# Patient Record
Sex: MALE | Race: BLACK OR AFRICAN AMERICAN | Employment: OTHER | URBAN - METROPOLITAN AREA
[De-identification: names, ages, dates, MRNs, and addresses within clinical notes are randomized per-mention and may not be internally consistent; named-entity substitution may affect disease eponyms.]

---

## 2021-09-27 ENCOUNTER — HOSPITAL ENCOUNTER (OUTPATIENT)
Dept: PREADMISSION TESTING | Age: 76
Discharge: HOME | End: 2021-09-27
Payer: MEDICARE

## 2021-09-27 PROCEDURE — U0005 INFEC AGEN DETEC AMPLI PROBE: HCPCS

## 2021-09-28 LAB — SARS-COV-2, NAA: NOT DETECTED

## 2021-09-30 ENCOUNTER — HOSPITAL ENCOUNTER (OUTPATIENT)
Age: 76
Discharge: HOME | End: 2021-09-30
Attending: UROLOGY | Admitting: UROLOGY
Payer: MEDICARE

## 2021-09-30 VITALS
TEMPERATURE: 98.7 F | HEART RATE: 83 BPM | OXYGEN SATURATION: 97 % | WEIGHT: 251.6 LBS | SYSTOLIC BLOOD PRESSURE: 169 MMHG | BODY MASS INDEX: 38.13 KG/M2 | DIASTOLIC BLOOD PRESSURE: 94 MMHG | HEIGHT: 68 IN

## 2021-09-30 PROBLEM — N40.0 ENLARGED PROSTATE: Status: ACTIVE | Noted: 2021-09-30

## 2021-09-30 PROCEDURE — 74011250636 HC RX REV CODE- 250/636: Performed by: NURSE ANESTHETIST, CERTIFIED REGISTERED

## 2021-09-30 PROCEDURE — 74011250637 HC RX REV CODE- 250/637: Performed by: NURSE ANESTHETIST, CERTIFIED REGISTERED

## 2021-09-30 RX ORDER — SODIUM CHLORIDE, SODIUM LACTATE, POTASSIUM CHLORIDE, CALCIUM CHLORIDE 600; 310; 30; 20 MG/100ML; MG/100ML; MG/100ML; MG/100ML
75 INJECTION, SOLUTION INTRAVENOUS CONTINUOUS
Status: DISCONTINUED | OUTPATIENT
Start: 2021-09-30 | End: 2021-09-30 | Stop reason: HOSPADM

## 2021-09-30 RX ORDER — FAMOTIDINE 20 MG/1
20 TABLET, FILM COATED ORAL ONCE
Status: COMPLETED | OUTPATIENT
Start: 2021-09-30 | End: 2021-09-30

## 2021-09-30 RX ORDER — SODIUM CHLORIDE 0.9 % (FLUSH) 0.9 %
5-40 SYRINGE (ML) INJECTION EVERY 8 HOURS
Status: DISCONTINUED | OUTPATIENT
Start: 2021-09-30 | End: 2021-09-30 | Stop reason: HOSPADM

## 2021-09-30 RX ORDER — SODIUM CHLORIDE 0.9 % (FLUSH) 0.9 %
5-40 SYRINGE (ML) INJECTION AS NEEDED
Status: DISCONTINUED | OUTPATIENT
Start: 2021-09-30 | End: 2021-09-30 | Stop reason: HOSPADM

## 2021-09-30 RX ADMIN — SODIUM CHLORIDE, SODIUM LACTATE, POTASSIUM CHLORIDE, AND CALCIUM CHLORIDE 75 ML/HR: 600; 310; 30; 20 INJECTION, SOLUTION INTRAVENOUS at 11:02

## 2021-09-30 RX ADMIN — FAMOTIDINE 20 MG: 20 TABLET ORAL at 11:02

## 2021-09-30 NOTE — DISCHARGE INSTRUCTIONS
DISCHARGE SUMMARY from Nurse    PATIENT INSTRUCTIONS:    After general anesthesia or intravenous sedation, for 24 hours or while taking prescription Narcotics:  What to do at Home:  Recommended activity: Activity as tolerated,     please follow up with Dr Ras Lafleur    *  Please give a list of your current medications to your Primary Care Provider. *  Please update this list whenever your medications are discontinued, doses are      changed, or new medications (including over-the-counter products) are added. *  Please carry medication information at all times in case of emergency situations. These are general instructions for a healthy lifestyle:    No smoking/ No tobacco products/ Avoid exposure to second hand smoke  Surgeon General's Warning:  Quitting smoking now greatly reduces serious risk to your health. Obesity, smoking, and sedentary lifestyle greatly increases your risk for illness    A healthy diet, regular physical exercise & weight monitoring are important for maintaining a healthy lifestyle    You may be retaining fluid if you have a history of heart failure or if you experience any of the following symptoms:  Weight gain of 3 pounds or more overnight or 5 pounds in a week, increased swelling in our hands or feet or shortness of breath while lying flat in bed. Please call your doctor as soon as you notice any of these symptoms; do not wait until your next office visit. Zzish Activation    Thank you for requesting access to [facility]zish. Please follow the instructions below to securely access and download your online medical record. Zzish allows you to send messages to your doctor, view your test results, renew your prescriptions, schedule appointments, and more. How Do I Sign Up? 1. In your internet browser, go to [email or web address]. 2. Click on the First Time User? Click Here link in the Sign In box. You will see the New Member Sign Up page.   3. Enter your Cor[employer]c Access Code exactly as it appears below. You will not need to use this code after youve completed the sign-up process. If you do not sign up before the expiration date, you must request a new code. Cor[employer]c Access Code: [email or web address]  Expires: 2021  9:39 AM (This is the date your Correlec access code will )    4. Enter the last four digits of your Social Security Number (xxxx) and Date of Birth (mm/dd/yyyy) as indicated and click Submit. You will be taken to the next sign-up page. 5. Create a Correlec ID. This will be your Cor[employer]c login ID and cannot be changed, so think of one that is secure and easy to remember. 6. Create a Correlec password. You can change your password at any time. 7. Enter your Password Reset Question and Answer. This can be used at a later time if you forget your password. 8. Enter your e-mail address. You will receive e-mail notification when new information is available in [address] 9. Click Sign Up. You can now view and download portions of your medical record. 10. Click the Download Summary menu link to download a portable copy of your medical information. Additional Information    If you have questions, please visit the Frequently Asked Questions section of the Cor[employer]c website at [email or web address] Remember, Cor[employer]c is NOT to be used for urgent needs. For medical emergencies, dial 911. Patient armband removed and shredded    The discharge information has been reviewed with the patient. The patient verbalized understanding. Discharge medications reviewed with the patient and appropriate educational materials and side effects teaching were provided.   ___________________________________________________________________________________________________________________________________

## 2022-01-09 ENCOUNTER — HOSPITAL ENCOUNTER (EMERGENCY)
Age: 77
Discharge: HOME | End: 2022-01-09
Attending: INTERNAL MEDICINE
Payer: MEDICARE

## 2022-01-09 ENCOUNTER — APPOINTMENT (OUTPATIENT)
Dept: GENERAL RADIOLOGY | Age: 77
End: 2022-01-09
Attending: INTERNAL MEDICINE
Payer: MEDICARE

## 2022-01-09 VITALS
RESPIRATION RATE: 18 BRPM | WEIGHT: 200 LBS | OXYGEN SATURATION: 100 % | HEART RATE: 88 BPM | HEIGHT: 68 IN | SYSTOLIC BLOOD PRESSURE: 145 MMHG | TEMPERATURE: 98.8 F | DIASTOLIC BLOOD PRESSURE: 76 MMHG | BODY MASS INDEX: 30.31 KG/M2

## 2022-01-09 DIAGNOSIS — J45.909 REACTIVE AIRWAY DISEASE WITHOUT COMPLICATION, UNSPECIFIED ASTHMA SEVERITY, UNSPECIFIED WHETHER PERSISTENT: Primary | ICD-10-CM

## 2022-01-09 DIAGNOSIS — Z20.822 PERSON UNDER INVESTIGATION FOR COVID-19: ICD-10-CM

## 2022-01-09 DIAGNOSIS — J18.9 ATYPICAL PNEUMONIA: ICD-10-CM

## 2022-01-09 LAB
ANION GAP SERPL CALC-SCNC: 9 MMOL/L
ARTERIAL PATENCY WRIST A: ABNORMAL
BASE EXCESS BLDA CALC-SCNC: 3.1 MMOL/L (ref 0–2.5)
BASOPHILS # BLD: 0 K/UL (ref 0–0.1)
BASOPHILS NFR BLD: 0 % (ref 0–2)
BDY SITE: ABNORMAL
BNP SERPL-MCNC: 34 PG/ML (ref 0–100)
BUN SERPL-MCNC: 13 MG/DL (ref 9–21)
BUN/CREAT SERPL: 10
CA-I BLD-MCNC: 9.4 MG/DL (ref 8.5–10.5)
CHLORIDE SERPL-SCNC: 100 MMOL/L (ref 94–111)
CO2 SERPL-SCNC: 29 MMOL/L (ref 21–33)
COHGB MFR BLD: 0.8 % (ref 0–3)
CREAT SERPL-MCNC: 1.3 MG/DL (ref 0.8–1.5)
DIFFERENTIAL METHOD BLD: ABNORMAL
EOSINOPHIL # BLD: 0 K/UL (ref 0–0.4)
EOSINOPHIL NFR BLD: 0 % (ref 0–5)
ERYTHROCYTE [DISTWIDTH] IN BLOOD BY AUTOMATED COUNT: 14.4 % (ref 11.6–14.5)
FIO2 ON VENT: 21 %
GLUCOSE SERPL-MCNC: 79 MG/DL (ref 70–110)
HCO3 BLDA-SCNC: 28 MMOL/L (ref 23–27)
HCT VFR BLD AUTO: 35.6 % (ref 36–48)
HGB BLD OXIMETRY-MCNC: 12.3 G/DL (ref 12–16)
HGB BLD-MCNC: 11.2 G/DL (ref 13–16)
IMM GRANULOCYTES # BLD AUTO: 0 K/UL (ref 0–0.04)
IMM GRANULOCYTES NFR BLD AUTO: 0 % (ref 0–0.5)
LACTATE SERPL-SCNC: 0.9 MMOL/L (ref 0.5–2)
LYMPHOCYTES # BLD: 0.9 K/UL (ref 0.9–3.6)
LYMPHOCYTES NFR BLD: 15 % (ref 21–52)
MAGNESIUM SERPL-MCNC: 1.8 MG/DL (ref 1.7–2.8)
MCH RBC QN AUTO: 26.7 PG (ref 24–34)
MCHC RBC AUTO-ENTMCNC: 31.5 G/DL (ref 31–37)
MCV RBC AUTO: 85 FL (ref 78–100)
METHGB MFR BLD: 0 % (ref 0–3)
MONOCYTES # BLD: 0.7 K/UL (ref 0.05–1.2)
MONOCYTES NFR BLD: 11 % (ref 3–10)
NEUTS SEG # BLD: 4.5 K/UL (ref 1.8–8)
NEUTS SEG NFR BLD: 74 % (ref 40–73)
NRBC # BLD: 0 K/UL (ref 0–0.01)
NRBC BLD-RTO: 0 PER 100 WBC
OXYHGB MFR BLD: 93.6 % (ref 90–100)
PCO2 BLDA: 43 MMHG (ref 35–45)
PH BLDA: 7.43 [PH] (ref 7.37–7.43)
PLATELET # BLD AUTO: 206 K/UL (ref 135–420)
PMV BLD AUTO: 10.4 FL (ref 9.2–11.8)
PO2 BLDA: 70 MMHG (ref 84–98)
POTASSIUM SERPL-SCNC: 3.5 MMOL/L (ref 3.2–5.1)
RBC # BLD AUTO: 4.19 M/UL (ref 4.35–5.65)
SAO2 % BLD: 94 % (ref 94–100)
SAO2% DEVICE SAO2% SENSOR NAME: ABNORMAL
SARS-COV-2, COV2: NORMAL
SODIUM SERPL-SCNC: 138 MMOL/L (ref 135–145)
SPECIMEN SITE: ABNORMAL
TROPONIN I SERPL-MCNC: <0.02 NG/ML (ref 0.02–0.05)
WBC # BLD AUTO: 6.1 K/UL (ref 4.6–13.2)

## 2022-01-09 PROCEDURE — 93005 ELECTROCARDIOGRAM TRACING: CPT

## 2022-01-09 PROCEDURE — 74011000250 HC RX REV CODE- 250: Performed by: INTERNAL MEDICINE

## 2022-01-09 PROCEDURE — 94644 CONT INHLJ TX 1ST HOUR: CPT

## 2022-01-09 PROCEDURE — 83605 ASSAY OF LACTIC ACID: CPT

## 2022-01-09 PROCEDURE — 71045 X-RAY EXAM CHEST 1 VIEW: CPT

## 2022-01-09 PROCEDURE — 83880 ASSAY OF NATRIURETIC PEPTIDE: CPT

## 2022-01-09 PROCEDURE — 36600 WITHDRAWAL OF ARTERIAL BLOOD: CPT

## 2022-01-09 PROCEDURE — 83735 ASSAY OF MAGNESIUM: CPT

## 2022-01-09 PROCEDURE — 74011250636 HC RX REV CODE- 250/636: Performed by: INTERNAL MEDICINE

## 2022-01-09 PROCEDURE — 94640 AIRWAY INHALATION TREATMENT: CPT

## 2022-01-09 PROCEDURE — 82803 BLOOD GASES ANY COMBINATION: CPT

## 2022-01-09 PROCEDURE — 85025 COMPLETE CBC W/AUTO DIFF WBC: CPT

## 2022-01-09 PROCEDURE — U0003 INFECTIOUS AGENT DETECTION BY NUCLEIC ACID (DNA OR RNA); SEVERE ACUTE RESPIRATORY SYNDROME CORONAVIRUS 2 (SARS-COV-2) (CORONAVIRUS DISEASE [COVID-19]), AMPLIFIED PROBE TECHNIQUE, MAKING USE OF HIGH THROUGHPUT TECHNOLOGIES AS DESCRIBED BY CMS-2020-01-R: HCPCS

## 2022-01-09 PROCEDURE — 80048 BASIC METABOLIC PNL TOTAL CA: CPT

## 2022-01-09 PROCEDURE — 84484 ASSAY OF TROPONIN QUANT: CPT

## 2022-01-09 PROCEDURE — 96375 TX/PRO/DX INJ NEW DRUG ADDON: CPT

## 2022-01-09 PROCEDURE — 99284 EMERGENCY DEPT VISIT MOD MDM: CPT

## 2022-01-09 PROCEDURE — 96365 THER/PROPH/DIAG IV INF INIT: CPT

## 2022-01-09 RX ORDER — METHYLPREDNISOLONE 4 MG/1
TABLET ORAL
Qty: 1 DOSE PACK | Refills: 0 | Status: SHIPPED | OUTPATIENT
Start: 2022-01-09

## 2022-01-09 RX ORDER — AZITHROMYCIN 250 MG/1
TABLET, FILM COATED ORAL
Qty: 6 TABLET | Refills: 0 | Status: SHIPPED | OUTPATIENT
Start: 2022-01-09 | End: 2022-01-14

## 2022-01-09 RX ORDER — ALBUTEROL SULFATE 2.5 MG/.5ML
5 SOLUTION RESPIRATORY (INHALATION)
Status: COMPLETED | OUTPATIENT
Start: 2022-01-09 | End: 2022-01-09

## 2022-01-09 RX ORDER — IPRATROPIUM BROMIDE AND ALBUTEROL SULFATE 2.5; .5 MG/3ML; MG/3ML
3 SOLUTION RESPIRATORY (INHALATION)
Status: COMPLETED | OUTPATIENT
Start: 2022-01-09 | End: 2022-01-09

## 2022-01-09 RX ORDER — MAGNESIUM SULFATE HEPTAHYDRATE 500 MG/ML
2 INJECTION, SOLUTION INTRAMUSCULAR; INTRAVENOUS
Status: DISCONTINUED | OUTPATIENT
Start: 2022-01-09 | End: 2022-01-09

## 2022-01-09 RX ORDER — ALBUTEROL SULFATE 90 UG/1
2 AEROSOL, METERED RESPIRATORY (INHALATION)
Qty: 1 EACH | Refills: 0 | Status: SHIPPED | OUTPATIENT
Start: 2022-01-09

## 2022-01-09 RX ORDER — MAGNESIUM SULFATE HEPTAHYDRATE 40 MG/ML
2 INJECTION, SOLUTION INTRAVENOUS
Status: COMPLETED | OUTPATIENT
Start: 2022-01-09 | End: 2022-01-09

## 2022-01-09 RX ADMIN — ALBUTEROL SULFATE 5 MG: 2.5 SOLUTION RESPIRATORY (INHALATION) at 10:40

## 2022-01-09 RX ADMIN — METHYLPREDNISOLONE SODIUM SUCCINATE 125 MG: 125 INJECTION, POWDER, FOR SOLUTION INTRAMUSCULAR; INTRAVENOUS at 10:36

## 2022-01-09 RX ADMIN — IPRATROPIUM BROMIDE AND ALBUTEROL SULFATE 3 ML: .5; 2.5 SOLUTION RESPIRATORY (INHALATION) at 10:41

## 2022-01-09 RX ADMIN — MAGNESIUM SULFATE 2 G: 2 INJECTION INTRAVENOUS at 10:37

## 2022-01-09 NOTE — ED PROVIDER NOTES
EMERGENCY DEPARTMENT HISTORY AND PHYSICAL EXAM      Date: 1/9/2022  Patient Name: Katheryn Litten    History of Presenting Illness     Chief Complaint   Patient presents with    Cough    Shortness of Breath       History Provided By: Patient    HPI: Katheryn Litten, 67 y.o. male with a past medical history significant for hypertension, hyperlipidemia, kidney stone, BPH; overactive bladder, gout, coronary artery disease with NSTEMI '14 w stent placement x2- Dr Raul Quevedo on Plavix, that presents to the ED with cc of SOB for several weeks. States that he often has allergies around this time of the year. He denies fever; chills, chest pain; hemoptysis; +nonproductive cough. Has had covid vaccine x 2  He was seen in cardiology clinic 12/22/21 for SOB  There are no other complaints, changes, or physical findings at this time. PCP: Shabana Grigsby NP    No current facility-administered medications on file prior to encounter. Current Outpatient Medications on File Prior to Encounter   Medication Sig Dispense Refill    levoFLOXacin (LEVAQUIN) 500 mg tablet Take 1 Tablet by mouth daily. 3 Tablet 0    amLODIPine (NORVASC) 5 mg tablet Take 5 mg by mouth daily.  finasteride (PROSCAR) 5 mg tablet Take 1 Tablet by mouth daily. 90 Tablet 3    influenza vaccine 2020-21, 65 yrs+,,PF, (Fluzone HighDose Quad 20-21 PF) syrg injection 1 Dose once.  montelukast (SINGULAIR) 10 mg tablet TAKE 1 TABLET BY MOUTH ONCE DAILY AT NIGHT AT BEDTIME      omeprazole (PRILOSEC) 20 mg capsule TAKE 1 CAPSULE BY MOUTH ONCE DAILY      tamsulosin (FLOMAX) 0.4 mg capsule Take 1 Cap by mouth daily. 90 Cap 3    allopurinol (ZYLOPRIM) 300 mg tablet       aspirin 81 mg chewable tablet chew and swallow 1 tablet by mouth once daily      atorvastatin (LIPITOR) 80 mg tablet take 1 tablet by mouth once daily      atropine 1 % ophthalmic solution Instill 1 Drop in Right eye 3 Times Daily.       bumetanide (BUMEX) 2 mg tablet   2    clopidogrel (PLAVIX) 75 mg tab 75 mg.      COLCRYS 0.6 mg tablet   0    lidocaine (LIDODERM) 5 % Apply 1 patch as directed for 12 hours every 24 hours (12 hours on, 12 hours off)      metoprolol succinate (TOPROL-XL) 50 mg XL tablet       naproxen (NAPROSYN) 500 mg tablet       valsartan (DIOVAN) 80 mg tablet       mirabegron ER (MYRBETRIQ) 50 mg ER tablet Take 1 Tab by mouth daily. 80 Tab 3       Past History     Past Medical History:  Past Medical History:   Diagnosis Date    CAD (coronary artery disease)     Coronary Stent in 2014    Hyperlipidemia     Hypertension     Kidney stone        Past Surgical History:  Past Surgical History:   Procedure Laterality Date    HX CORONARY STENT PLACEMENT      HX UROLOGICAL N/A 07/16/2018    TRUS Vol. 92.0cc's - PNBx - A few atypical glands which are best seen on lvl 1 & 2 suspicious for carcinoma. [facility] [place] - No evidence of malignancy -- Dr. Raad Fisher       Family History:  No family history on file. Social History:  Social History     Tobacco Use    Smoking status: Former Smoker     Packs/day: 1.00     Types: Cigarettes    Smokeless tobacco: Former User     Types: Chew   Vaping Use    Vaping Use: Never used   Substance Use Topics    Alcohol use: No    Drug use: No       Allergies:  No Known Allergies      Review of Systems     Review of Systems   Constitutional: Negative for chills and fever. HENT: Negative for sore throat. Eyes: Negative for redness. Respiratory: Positive for cough and shortness of breath. Negative for chest tightness and wheezing. Cardiovascular: Negative for chest pain, palpitations and leg swelling. Gastrointestinal: Negative for abdominal pain, diarrhea, nausea and vomiting. Genitourinary: Negative for dysuria and flank pain. Musculoskeletal: Negative for arthralgias and myalgias. Skin: Negative for rash. Neurological: Negative for headaches. Psychiatric/Behavioral: Negative for confusion.        Physical Exam Physical Exam  Vitals and nursing note reviewed. Constitutional:       General: He is not in acute distress. Appearance: He is well-developed. He is obese. He is not ill-appearing, toxic-appearing or diaphoretic. Comments: Able to speak in complete sentences   HENT:      Head: Normocephalic. Eyes:      Pupils: Pupils are equal, round, and reactive to light. Cardiovascular:      Rate and Rhythm: Normal rate and regular rhythm. Heart sounds: Normal heart sounds. Pulmonary:      Effort: Pulmonary effort is normal. No respiratory distress. Breath sounds: Normal breath sounds. No rales. Comments: Very mild, distant expiratory wheezes and both lower aspects of the lungs  Abdominal:      General: Bowel sounds are normal. There is no distension. Palpations: Abdomen is soft. Tenderness: There is no abdominal tenderness. There is no guarding or rebound. Musculoskeletal:         General: No tenderness. Normal range of motion. Cervical back: Normal range of motion and neck supple. Right lower leg: No edema. Left lower leg: No edema. Skin:     General: Skin is warm and dry. Neurological:      Mental Status: He is alert and oriented to person, place, and time.    Psychiatric:         Behavior: Behavior normal.         Lab and Diagnostic Study Results     Labs -     Recent Results (from the past 12 hour(s))   CBC WITH AUTOMATED DIFF    Collection Time: 01/09/22 10:30 AM   Result Value Ref Range    WBC 6.1 4.6 - 13.2 K/uL    RBC 4.19 (L) 4.35 - 5.65 M/uL    HGB 11.2 (L) 13.0 - 16.0 g/dL    HCT 35.6 (L) 36.0 - 48.0 %    MCV 85.0 78.0 - 100.0 FL    MCH 26.7 24.0 - 34.0 PG    MCHC 31.5 31.0 - 37.0 g/dL    RDW 14.4 11.6 - 14.5 %    PLATELET 720 813 - 161 K/uL    MPV 10.4 9.2 - 11.8 FL    NRBC 0.0 0.0  WBC    ABSOLUTE NRBC 0.00 0.00 - 0.01 K/uL    NEUTROPHILS 74 (H) 40 - 73 %    LYMPHOCYTES 15 (L) 21 - 52 %    MONOCYTES 11 (H) 3 - 10 %    EOSINOPHILS 0 0 - 5 % BASOPHILS 0 0 - 2 %    IMMATURE GRANULOCYTES 0 0 - 0.5 %    ABS. NEUTROPHILS 4.5 1.8 - 8.0 K/UL    ABS. LYMPHOCYTES 0.9 0.9 - 3.6 K/UL    ABS. MONOCYTES 0.7 0.05 - 1.2 K/UL    ABS. EOSINOPHILS 0.0 0.0 - 0.4 K/UL    ABS. BASOPHILS 0.0 0.0 - 0.1 K/UL    ABS. IMM. GRANS. 0.0 0.00 - 0.04 K/UL    DF AUTOMATED     METABOLIC PANEL, BASIC    Collection Time: 01/09/22 10:30 AM   Result Value Ref Range    Sodium 138 135 - 145 mmol/L    Potassium 3.5 3.2 - 5.1 mmol/L    Chloride 100 94 - 111 mmol/L    CO2 29 21 - 33 mmol/L    Anion gap 9 mmol/L    Glucose 79 70 - 110 mg/dL    BUN 13 9 - 21 mg/dL    Creatinine 1.30 0.8 - 1.50 mg/dL    BUN/Creatinine ratio 10      GFR est AA >60 ml/min/1.73m2    GFR est non-AA 54 ml/min/1.73m2    Calcium 9.4 8.5 - 10.5 mg/dL   TROPONIN I    Collection Time: 01/09/22 10:30 AM   Result Value Ref Range    Troponin-I, Qt. <0.02 (L) 0.02 - 0.05 ng/mL   BNP    Collection Time: 01/09/22 10:30 AM   Result Value Ref Range    BNP 34 0 - 100 pg/mL   LACTIC ACID    Collection Time: 01/09/22 10:30 AM   Result Value Ref Range    Lactic acid 0.9 0.5 - 2.0 mmol/L   MAGNESIUM    Collection Time: 01/09/22 10:30 AM   Result Value Ref Range    Magnesium 1.8 1.7 - 2.8 mg/dL   BLOOD GAS, ARTERIAL    Collection Time: 01/09/22 10:43 AM   Result Value Ref Range    pH 7.43 7.37 - 7.43      PCO2 43 35.0 - 45.0 mmHg    PO2 70 (L) 84 - 98 mmHg    O2 SAT 94 94 - 100 %    BICARBONATE 28 (H) 23.0 - 27.0 mmol/L    BASE EXCESS 3.1 (H) 0.0 - 2.5 mmol/L    O2 METHOD Room air      FIO2 21.0 %    Sample source Arterial      SITE Right Radial      JOHNSON'S TEST PASS      Carboxy-Hgb 0.8 0 - 3 %    Methemoglobin 0.0 0 - 3 %    tHb 12.3 12.0 - 16.0 g/dL    Oxyhemoglobin 93.6 90 - 100 %       Radiologic Studies -   @lastxrresult@  CT Results  (Last 48 hours)    None        CXR Results  (Last 48 hours)               01/09/22 1051  XR CHEST PORT Final result    Impression:  Interstitial prominence with some superimposed hazy opacity. Diagnostic considerations would include pulmonary edema or multifocal atypical   pneumonia. Narrative:  EXAM: Chest radiograph       CLINICAL INDICATION/HISTORY: Cough      --Additional history: None. COMPARISON: 07/16/2016       TECHNIQUE: Frontal view of the chest       _______________       FINDINGS:       SUPPORT DEVICES: None. HEART AND MEDIASTINUM: Cardiac silhouette is normal in size. Normal mediastinal   contours . Normal pulmonary vasculature. LUNGS AND PLEURAL SPACES: Mild interstitial prominence with some superimposed   hazy opacities within the lungs. Elevated left hemidiaphragm. Sharp costophrenic   sulci. No pneumothoraces. BONY THORAX AND SOFT TISSUES: Unremarkable.       _______________                   Medical Decision Making   - I am the first provider for this patient. - I reviewed the vital signs, available nursing notes, past medical history, past surgical history, family history and social history. - Initial assessment performed. The patients presenting problems have been discussed, and they are in agreement with the care plan formulated and outlined with them. I have encouraged them to ask questions as they arise throughout their visit. Vital Signs-Reviewed the patient's vital signs. Patient Vitals for the past 12 hrs:   Temp Pulse Resp BP SpO2   01/09/22 1138  88 18 (!) 145/76 100 %   01/09/22 1041     100 %   01/09/22 1031 98.8 °F (37.1 °C) 80 18 138/85 96 %       Records Reviewed: Nursing Notes  EKG 1024: NSR 88; normal NJ; QRS; axis; QTc: 446. Flat T in I and V2. Slight T inversion in aVL. No acute ST changes    ED Course:   11:36 AM  States that he feels better. good O2 sat. CXR with interstitial pattern; no clinical CHF; will do covid testing; give z pack; medrol dose pack;fu w pcp    Procedures       Disposition   Disposition: Discharge      DISCHARGE PLAN:  1.    Current Discharge Medication List      CONTINUE these medications which have NOT CHANGED    Details   levoFLOXacin (LEVAQUIN) 500 mg tablet Take 1 Tablet by mouth daily. Qty: 3 Tablet, Refills: 0      amLODIPine (NORVASC) 5 mg tablet Take 5 mg by mouth daily. finasteride (PROSCAR) 5 mg tablet Take 1 Tablet by mouth daily. Qty: 90 Tablet, Refills: 3      influenza vaccine 2020-21, 65 yrs+,,PF, (Fluzone HighDose Quad 20-21 PF) syrg injection 1 Dose once. montelukast (SINGULAIR) 10 mg tablet TAKE 1 TABLET BY MOUTH ONCE DAILY AT NIGHT AT BEDTIME      omeprazole (PRILOSEC) 20 mg capsule TAKE 1 CAPSULE BY MOUTH ONCE DAILY      tamsulosin (FLOMAX) 0.4 mg capsule Take 1 Cap by mouth daily. Qty: 90 Cap, Refills: 3      allopurinol (ZYLOPRIM) 300 mg tablet       aspirin 81 mg chewable tablet chew and swallow 1 tablet by mouth once daily      atorvastatin (LIPITOR) 80 mg tablet take 1 tablet by mouth once daily      atropine 1 % ophthalmic solution Instill 1 Drop in Right eye 3 Times Daily. bumetanide (BUMEX) 2 mg tablet Refills: 2      clopidogrel (PLAVIX) 75 mg tab 75 mg. COLCRYS 0.6 mg tablet Refills: 0      lidocaine (LIDODERM) 5 % Apply 1 patch as directed for 12 hours every 24 hours (12 hours on, 12 hours off)      metoprolol succinate (TOPROL-XL) 50 mg XL tablet       naproxen (NAPROSYN) 500 mg tablet       valsartan (DIOVAN) 80 mg tablet       mirabegron ER (MYRBETRIQ) 50 mg ER tablet Take 1 Tab by mouth daily. Qty: 90 Tab, Refills: 3           2. Follow-up Information     Follow up With Specialties Details Why Contact Adriel Sarmiento NP Nurse Practitioner Schedule an appointment as soon as possible for a visit in 2 days  [facility] [address]  [phone]          3. Return to ED if worse   4.    Current Discharge Medication List      START taking these medications    Details   azithromycin (Zithromax Z-Travis) 250 mg tablet Take 2 tablets today then 1 tablet daily for the next 4 days  Qty: 6 Tablet, Refills: 0  Start date: 1/9/2022, End date: 1/14/2022      methylPREDNISolone (MEDROL DOSEPACK) 4 mg tablet Take as directed on package  Qty: 1 Dose Pack, Refills: 0  Start date: 1/9/2022      albuterol (PROVENTIL HFA, VENTOLIN HFA, PROAIR HFA) 90 mcg/actuation inhaler Take 2 Puffs by inhalation every six (6) hours as needed for Wheezing or Shortness of Breath. Qty: 1 Each, Refills: 0  Start date: 1/9/2022               Diagnosis     Clinical Impression:   1. Reactive airway disease without complication, unspecified asthma severity, unspecified whether persistent    2. Atypical pneumonia    3. Person under investigation for COVID-19        Attestations:    Jacques Grossman MD    Please note that this dictation was completed with Ingresse, the [employer] voice recognition software. Quite often unanticipated grammatical, syntax, homophones, and other interpretive errors are inadvertently transcribed by the computer software. Please disregard these errors. Please excuse any errors that have escaped final proofreading. Thank you.

## 2022-01-10 LAB
ATRIAL RATE: 88 BPM
CALCULATED P AXIS, ECG09: 35 DEGREES
CALCULATED R AXIS, ECG10: 48 DEGREES
CALCULATED T AXIS, ECG11: 88 DEGREES
DIAGNOSIS, 93000: NORMAL
P-R INTERVAL, ECG05: 149 MS
Q-T INTERVAL, ECG07: 368 MS
QRS DURATION, ECG06: 84 MS
QTC CALCULATION (BEZET), ECG08: 446 MS
VENTRICULAR RATE, ECG03: 88 BPM

## 2022-01-12 LAB — SARS-COV-2, COV2NT: DETECTED

## 2022-03-19 PROBLEM — N40.0 ENLARGED PROSTATE: Status: ACTIVE | Noted: 2021-09-30

## 2025-04-03 ENCOUNTER — HOSPITAL ENCOUNTER (EMERGENCY)
Age: 80
Discharge: HOME | End: 2025-04-03
Attending: FAMILY MEDICINE
Payer: MEDICARE

## 2025-04-03 ENCOUNTER — APPOINTMENT (OUTPATIENT)
Age: 80
End: 2025-04-03
Payer: MEDICARE

## 2025-04-03 VITALS
TEMPERATURE: 98.4 F | HEART RATE: 92 BPM | WEIGHT: 253 LBS | SYSTOLIC BLOOD PRESSURE: 153 MMHG | BODY MASS INDEX: 38.47 KG/M2 | RESPIRATION RATE: 18 BRPM | DIASTOLIC BLOOD PRESSURE: 94 MMHG | OXYGEN SATURATION: 93 %

## 2025-04-03 DIAGNOSIS — S02.2XXA CLOSED FRACTURE OF NASAL BONE, INITIAL ENCOUNTER: Primary | ICD-10-CM

## 2025-04-03 PROCEDURE — 70160 X-RAY EXAM OF NASAL BONES: CPT

## 2025-04-03 PROCEDURE — 99283 EMERGENCY DEPT VISIT LOW MDM: CPT

## 2025-04-03 ASSESSMENT — LIFESTYLE VARIABLES
HOW OFTEN DO YOU HAVE A DRINK CONTAINING ALCOHOL: NEVER
HOW MANY STANDARD DRINKS CONTAINING ALCOHOL DO YOU HAVE ON A TYPICAL DAY: PATIENT DOES NOT DRINK

## 2025-04-04 ASSESSMENT — ENCOUNTER SYMPTOMS
RESPIRATORY NEGATIVE: 1
GASTROINTESTINAL NEGATIVE: 1

## 2025-04-04 NOTE — ED TRIAGE NOTES
Pt arrived via ems for a fall, pt was sitting in his chair at the table and fell out and landed on his face, bleeding noted from his nose by ems, now controlled. Aox4, no loc.

## 2025-04-04 NOTE — DISCHARGE INSTRUCTIONS
Your x-ray showed a left nasal bone fracture.  It should heal well on its own.  Consider use of Tylenol and/or an ice pack for pain control.  Follow-up with your primary doctor.  Return to the emergency department for new or worsening symptoms.

## 2025-04-04 NOTE — ED PROVIDER NOTES
TAKE 1 CAPSULE BY MOUTH ONCE DAILY    TAMSULOSIN (FLOMAX) 0.4 MG CAPSULE    Take 0.4 mg by mouth daily    VALSARTAN (DIOVAN) 80 MG TABLET    ceived the following from [facility]: Outside name: valsartan (DIOVAN) 80 mg tablet       ALLERGIES     Patient has no known allergies.    FAMILY HISTORY     History reviewed. No pertinent family history.       SOCIAL HISTORY       Social History     Socioeconomic History    Marital status: Single     Spouse name: None    Number of children: None    Years of education: None    Highest education level: None   Tobacco Use    Smoking status: Former     Current packs/day: 1.00     Types: Cigarettes    Smokeless tobacco: Former   Substance and Sexual Activity    Alcohol use: No    Drug use: No       SCREENINGS                         [place] Coma Scale  Eye Opening: Spontaneous  Best Verbal Response: Oriented  Best Motor Response: Obeys commands  Jennifer Coma Scale Score: 15                     CIWA Assessment  BP: (!) 152/85  Pulse: 92                 PHYSICAL EXAM       ED Triage Vitals [04/03/25 2218]   BP Systolic BP Percentile Diastolic BP Percentile Temp Temp Source Pulse Respirations SpO2   (!) 152/85 -- -- 98.4 °F (36.9 °C) Oral 92 18 94 %      Height Weight - Scale         -- 114.8 kg (253 lb)             Physical Exam  Vitals and nursing note reviewed.   Constitutional:       General: He is not in acute distress.     Appearance: Normal appearance. He is obese. He is not ill-appearing, toxic-appearing or diaphoretic.   HENT:      Head: Normocephalic and atraumatic.      Right Ear: External ear normal.      Left Ear: External ear normal.      Nose: No nasal deformity.      Right Nostril: No epistaxis or septal hematoma.      Left Nostril: No epistaxis or septal hematoma.        Comments: Nasal bridge swelling with tenderness to palpation, no deformity noted     Mouth/Throat:      Mouth: Mucous membranes are moist.   Eyes:      General: No scleral icterus.

## (undated) DEVICE — FOUR LEAD ARTHROSCOPIC IRRIGATION SET

## (undated) DEVICE — SYRINGE,TOOMEY,IRRIGATION,70CC,STERILE: Brand: MEDLINE

## (undated) DEVICE — BAG DRNGE NONSTERILE W/ SUCT HOSE CYSTO/UROLOGICAL FOR GE

## (undated) DEVICE — CONTAINER DRN 20L DISP FLUIDRN LLS

## (undated) DEVICE — UROLOGY SET

## (undated) DEVICE — ROTATIONS-MORCELLATOR Ø 4.8MM WL 335MM  FOR MORCESCOPE, FOR MORCELLATION FOLLOWING LASER PROSTATE ENUCLEATION, STERILE: Brand: PIRANHA

## (undated) DEVICE — SLEEVE COMPR L THGH LEN WARP

## (undated) DEVICE — SOLUTION SCRB 4OZ 10% PVP I POVIDONE IOD TOP PAINT EXIDINE

## (undated) DEVICE — PLUG CATH CAP URETH FOL STRL --

## (undated) DEVICE — SOLUTION IRRIG 1000ML H2O STRL BLT

## (undated) DEVICE — TUBE FOR SUCTION  PVC, PACK=10 PCS, STERILE, FOR SINGLE USE: Brand: PIRANHA

## (undated) DEVICE — SOLUTION IRRIG 3000ML 0.9% SOD CHL FLX CONT 0797208] ICU MEDICAL INC]

## (undated) DEVICE — CATHETER FOL 3 W 22 FR 75 CC URETH CREEVY LUBRICATH

## (undated) DEVICE — CATHETER URETH 22FR 30CC BLLN F 3 W SPEC M RND TIP TWO

## (undated) DEVICE — Device

## (undated) DEVICE — TUBING IRRIG L77IN DIA0.241IN L BOR FOR CYSTO W/ NVENT

## (undated) DEVICE — GARMENT,MEDLINE,DVT,INT,CALF,MED, GEN2: Brand: MEDLINE

## (undated) DEVICE — SOLUTION IV 1000ML 0.9% SOD CHL

## (undated) DEVICE — TRAY CATH 16FR BLLN 5CC DRNGE BG 2000ML SIL F ANTIREFLX

## (undated) DEVICE — SOFT SILICONE HYDROCELLULAR SACRUM DRESSING WITH LOCK AWAY LAYER: Brand: ALLEVYN LIFE SACRUM (LARGE) PACK OF 10

## (undated) DEVICE — CATH URETH FOL 3W MED 22FRX30 --

## (undated) DEVICE — BLANKET WRM AD W50XL85.8IN PACU FULL BODY FORC AIR

## (undated) DEVICE — GDWIRE UROL STR 150CM FLX TP -- BX/5 SENSOR

## (undated) DEVICE — SPONGE GZ W4XL4IN COT 12 PLY TYP VII WVN C FLD DSGN

## (undated) DEVICE — STATLOCKTM STABILIZATION DEVICES (PICC PLUS, CRESCENT FOAM PAD, FIXED POST RETAINER): Brand: STATLOCK

## (undated) DEVICE — STERILE POLYISOPRENE POWDER-FREE SURGICAL GLOVES: Brand: PROTEXIS